# Patient Record
Sex: FEMALE | Race: WHITE
[De-identification: names, ages, dates, MRNs, and addresses within clinical notes are randomized per-mention and may not be internally consistent; named-entity substitution may affect disease eponyms.]

---
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
## 2018-03-09 ENCOUNTER — HOSPITAL ENCOUNTER (EMERGENCY)
Dept: HOSPITAL 17 - NEPD | Age: 18
LOS: 1 days | Discharge: HOME | End: 2018-03-10
Payer: MEDICAID

## 2018-03-09 VITALS
SYSTOLIC BLOOD PRESSURE: 116 MMHG | DIASTOLIC BLOOD PRESSURE: 76 MMHG | TEMPERATURE: 98.6 F | OXYGEN SATURATION: 100 % | HEART RATE: 89 BPM

## 2018-03-09 VITALS — BODY MASS INDEX: 20.7 KG/M2 | WEIGHT: 121.25 LBS | HEIGHT: 64 IN

## 2018-03-09 DIAGNOSIS — B96.89: ICD-10-CM

## 2018-03-09 DIAGNOSIS — N76.0: Primary | ICD-10-CM

## 2018-03-09 LAB
AMORPHOUS SEDIMENT, URINE: (no result)
COLOR UR: (no result)
GLUCOSE UR STRIP-MCNC: (no result) MG/DL
HGB UR QL STRIP: (no result)
KETONES UR STRIP-MCNC: (no result) MG/DL
NITRITE UR QL STRIP: (no result)
SP GR UR STRIP: 1.01 (ref 1–1.03)
SQUAMOUS #/AREA URNS HPF: 7 /HPF (ref 0–5)
URINE LEUKOCYTE ESTERASE: (no result)

## 2018-03-09 PROCEDURE — 87210 SMEAR WET MOUNT SALINE/INK: CPT

## 2018-03-09 PROCEDURE — 87591 N.GONORRHOEAE DNA AMP PROB: CPT

## 2018-03-09 PROCEDURE — 81001 URINALYSIS AUTO W/SCOPE: CPT

## 2018-03-09 PROCEDURE — 99284 EMERGENCY DEPT VISIT MOD MDM: CPT

## 2018-03-09 PROCEDURE — 84703 CHORIONIC GONADOTROPIN ASSAY: CPT

## 2018-03-09 PROCEDURE — 87491 CHLMYD TRACH DNA AMP PROBE: CPT

## 2018-03-09 NOTE — PD
HPI


Chief Complaint:   Complaint


Time Seen by Provider:  22:36


Travel History


International Travel<30 days:  No


Contact w/Intl Traveler<30days:  No


Traveled to known affect area:  No





History of Present Illness


HPI


This patient was examined in the presence of a female nurse at all times.  17-

year-old female presents for evaluation of abdominal discomfort and vaginal 

discharge.  She reports over the past 2 weeks she has had an intermittent 

crampy pain in her lower abdomen which she says most feels like menstrual 

cramps.  Today she noticed white discharge and this is what prompted 

evaluation.  She reports associated vaginal itching and irritation, worse when 

she is wearing tight fitting clothing.  She endorses increased urinary 

frequency.  Denies nausea, vomiting, diarrhea, constipation, fevers, chills, 

dysuria, flank pain.  She reports that she is sexually active with one new male 

partner over the past 2 months.  She has no other complaints at this time.





PFSH


Past Medical History


Anxiety:  Yes


Developmental Delay:  No


Diminished Hearing:  No


Reproductive:  Yes (OVARIAN CYST)


Immunizations Current:  Yes


Pregnant?:  Unknown


:  0


Ovarian Cysts:  Yes





Past Surgical History


Surgical History:  No Previous Surgery





Social History


Alcohol Use:  Yes


Tobacco Use:  Yes


Substance Use:  No





Allergies-Medications


(Allergen,Severity, Reaction):  


Coded Allergies:  


     No Known Allergies (Verified  Adverse Reaction, Unknown, 3/9/18)


Reported Meds & Prescriptions





Reported Meds & Active Scripts


Active


Flagyl (Metronidazole) 500 Mg Tab 500 Mg PO BID 7 Days








Review of Systems


Except as stated in HPI:  all other systems reviewed are Neg





Physical Exam


Narrative


Examined in the presence of a female nurse at all times


GENERAL: Well-developed well-nourished female no acute distress


SKIN: Warm and dry.


HEAD: Atraumatic. Normocephalic. 


EYES: Pupils equal and round. No scleral icterus. No injection or drainage. 


ENT: No nasal bleeding or discharge.  Mucous membranes pink and moist.


NECK: Trachea midline. No JVD. 


CARDIOVASCULAR: Regular rate and rhythm.  No murmur appreciated.


RESPIRATORY: No accessory muscle use. Clear to auscultation. Breath sounds 

equal bilaterally. 


GASTROINTESTINAL: Abdomen soft mild right lower quadrant tenderness without 

guarding.  No CVA tenderness.


Pelvic examination the presence of a female nurse: There is some thin white 

discharge noted in the vaginal canal.  There is no cervical motion tenderness.  

There is no adnexal tenderness.


MUSCULOSKELETAL: No obvious deformities. No clubbing.  No cyanosis.  No edema. 


NEUROLOGICAL: Awake and alert. No obvious cranial nerve deficits.  Motor 

grossly within normal limits. Normal speech.





Data


Data


Last Documented VS





Vital Signs








  Date Time  Temp Pulse Resp B/P (MAP) Pulse Ox O2 Delivery O2 Flow Rate FiO2


 


3/9/18 23:36        


 


3/9/18 20:21 98.6 89   100   








Orders





 Orders


Urinalysis - C+S If Indicated (3/9/18 20:38)


Ed Urine Pregnancytest Poc (3/9/18 20:38)


Gc And Chlamydia Pcr (3/9/18 22:47)


Wet Prep Profile (3/9/18 22:47)


Ketorolac Inj (Toradol Inj) (3/9/18 23:15)


Ed Discharge Order (3/9/18 23:38)





Labs





Laboratory Tests








Test


  3/9/18


20:42 3/9/18


23:05


 


Urine Color LIGHT-YELLOW  


 


Urine Turbidity CLEAR  


 


Urine pH 7.5  


 


Urine Specific Gravity 1.013  


 


Urine Protein NEG mg/dL  


 


Urine Glucose (UA) NEG mg/dL  


 


Urine Ketones NEG mg/dL  


 


Urine Occult Blood NEG  


 


Urine Nitrite NEG  


 


Urine Bilirubin NEG  


 


Urine Urobilinogen


  LESS THAN 2.0


MG/DL 


 


 


Urine Leukocyte Esterase NEG  


 


Urine WBC


  LESS THAN 1


/hpf 


 


 


Urine Squamous Epithelial


Cells 7 /hpf 


  


 


 


Urine Amorphous Sediment RARE  


 


Microscopic Urinalysis Comment


  CULT NOT


INDICATED 


 


 


Clue Cells (Wet Prep)  PRESENT 


 


Vaginal Trichomonas (Wet Prep)  NONE SEEN 


 


Vaginal Yeast (Wet Prep)  NONE SEEN 











MDM


Medical Decision Making


Medical Screen Exam Complete:  Yes


Emergency Medical Condition:  Yes


Medical Record Reviewed:  Yes


Differential Diagnosis


Vaginitis, vaginosis, cervicitis, pelvic inflammatory disease


Narrative Course


17-year-old female with intermittent cramping sensation in the pelvic region 

for the past few weeks, now with 1 day history of itchy white vaginal 

discharge.  Physical examination is reassuring.  She had very mild right lower 

quadrant tenderness however her history is not at all concerning for 

appendicitis.  On pelvic examination she has no adnexal tenderness or cervical 

motion tenderness.  There was thin white discharge noted in the vaginal canal.  

Wet prep and Chlamydia gonorrhea probe have been performed.  Urinalysis is 

unremarkable.  Urine pregnancy test is negative.  Toradol has been ordered.





Wet prep is positive for clue cells consistent with bacterial vaginosis.  The 

patient will be treated with Flagyl.  It should be noted that the patient came 

with her boyfriend but she requested that no information be discussed with him 

in the room and therefore he was asked to leave the room during history and 

examination.





Diagnosis





 Primary Impression:  


 Bacterial vaginosis





***Additional Instructions:  


Medication as prescribed.  Follow-up with primary care physician.  Return for 

any emergent medical conditions.


***Med/Other Pt SpecificInfo:  Prescription(s) given


Scripts


Metronidazole (Flagyl) 500 Mg Tab


500 MG PO BID for Infection for 7 Days, #14 TAB 0 Refills


   Prov: Rusty Ball MD         3/9/18


Disposition:  01 DISCHARGE HOME


Condition:  Stable











Blair Gilbert Mar 9, 2018 22:51